# Patient Record
Sex: FEMALE
[De-identification: names, ages, dates, MRNs, and addresses within clinical notes are randomized per-mention and may not be internally consistent; named-entity substitution may affect disease eponyms.]

---

## 2018-04-19 ENCOUNTER — HOSPITAL ENCOUNTER (EMERGENCY)
Dept: HOSPITAL 31 - C.ER | Age: 23
Discharge: HOME | End: 2018-04-19
Payer: MEDICAID

## 2018-04-19 VITALS — OXYGEN SATURATION: 100 %

## 2018-04-19 VITALS
HEART RATE: 95 BPM | TEMPERATURE: 98.1 F | RESPIRATION RATE: 18 BRPM | DIASTOLIC BLOOD PRESSURE: 70 MMHG | SYSTOLIC BLOOD PRESSURE: 109 MMHG

## 2018-04-19 DIAGNOSIS — N83.209: Primary | ICD-10-CM

## 2018-04-19 LAB
ALBUMIN SERPL-MCNC: 4.6 G/DL (ref 3.5–5)
ALBUMIN/GLOB SERPL: 1.2 {RATIO} (ref 1–2.1)
ALT SERPL-CCNC: 28 U/L (ref 9–52)
AMYLASE SERPL-CCNC: 94 U/L (ref 30–110)
AST SERPL-CCNC: 39 U/L (ref 14–36)
BASOPHILS # BLD AUTO: 0 K/UL (ref 0–0.2)
BASOPHILS NFR BLD: 0.3 % (ref 0–2)
BILIRUB UR-MCNC: NEGATIVE MG/DL
BUN SERPL-MCNC: 15 MG/DL (ref 7–17)
CALCIUM SERPL-MCNC: 8.9 MG/DL (ref 8.6–10.4)
COLOR UR: YELLOW
EOSINOPHIL # BLD AUTO: 0 K/UL (ref 0–0.7)
EOSINOPHIL NFR BLD: 0.1 % (ref 0–4)
ERYTHROCYTE [DISTWIDTH] IN BLOOD BY AUTOMATED COUNT: 13.1 % (ref 11.5–14.5)
GFR NON-AFRICAN AMERICAN: > 60
GLUCOSE UR STRIP-MCNC: NEGATIVE MG/DL
HCG,QUALITATIVE URINE: NEGATIVE
HGB BLD-MCNC: 14.5 G/DL (ref 11–16)
LIPASE: 75 U/L (ref 23–300)
LYMPHOCYTES # BLD AUTO: 1 K/UL (ref 1–4.3)
LYMPHOCYTES NFR BLD AUTO: 10 % (ref 20–40)
MCH RBC QN AUTO: 31.4 PG (ref 27–31)
MCHC RBC AUTO-ENTMCNC: 33.5 G/DL (ref 33–37)
MCV RBC AUTO: 93.6 FL (ref 81–99)
MONOCYTES # BLD: 0.3 K/UL (ref 0–0.8)
MONOCYTES NFR BLD: 2.4 % (ref 0–10)
NEUTROPHILS # BLD: 9.1 K/UL (ref 1.8–7)
NEUTROPHILS NFR BLD AUTO: 87.2 % (ref 50–75)
NRBC BLD AUTO-RTO: 0 % (ref 0–2)
PH UR STRIP: 6 [PH] (ref 5–8)
PLATELET # BLD: 197 K/UL (ref 130–400)
PMV BLD AUTO: 9.6 FL (ref 7.2–11.7)
PROT UR STRIP-MCNC: NEGATIVE MG/DL
RBC # BLD AUTO: 4.62 MIL/UL (ref 3.8–5.2)
RBC # UR STRIP: NEGATIVE /UL
SP GR UR STRIP: 1.02 (ref 1–1.03)
UROBILINOGEN UR-MCNC: 0.2 MG/DL (ref 0.2–1)
WBC # BLD AUTO: 10.4 K/UL (ref 4.8–10.8)

## 2018-04-19 PROCEDURE — 96361 HYDRATE IV INFUSION ADD-ON: CPT

## 2018-04-19 PROCEDURE — 80053 COMPREHEN METABOLIC PANEL: CPT

## 2018-04-19 PROCEDURE — 76830 TRANSVAGINAL US NON-OB: CPT

## 2018-04-19 PROCEDURE — 85025 COMPLETE CBC W/AUTO DIFF WBC: CPT

## 2018-04-19 PROCEDURE — 82150 ASSAY OF AMYLASE: CPT

## 2018-04-19 PROCEDURE — 99283 EMERGENCY DEPT VISIT LOW MDM: CPT

## 2018-04-19 PROCEDURE — 81001 URINALYSIS AUTO W/SCOPE: CPT

## 2018-04-19 PROCEDURE — 83690 ASSAY OF LIPASE: CPT

## 2018-04-19 PROCEDURE — 76856 US EXAM PELVIC COMPLETE: CPT

## 2018-04-19 PROCEDURE — 96374 THER/PROPH/DIAG INJ IV PUSH: CPT

## 2018-04-19 PROCEDURE — 84703 CHORIONIC GONADOTROPIN ASSAY: CPT

## 2018-04-19 NOTE — US
HISTORY:

Sharp left hemipelvis pain. Menstrual status: LMP 02/27/2018.  



COMPARISON:

None available.



TECHNIQUE:

Transabdominal, transvaginal. Real -time technique with 2D, duplex 

and color Doppler.



FINDINGS:



UTERUS:

Measures 3.8 x 5.1 x 8.6 cm. Normal in size and appearance. No 

fibroid or other mass lesion seen.



ENDOMETRIUM:

Measures 12.6 mm in diameter. Endometrial thickening.  Tiny cystic 

focus 1.6 x 1.9 x 3.3 mm. 



CERVIX:

No cervical abnormality identified.Incidental finding: Nabothian 

cysts the largest measures less than 1 cm. 



RIGHT OVARY:

Measures 3.8 x 4.1 x 5.7 cm. No solid mass. Normal flow. Multiple 

subcentimeter follicles. 



LEFT OVARY:

Measures 3.4 x 4.2 x 5.9 cm. No solid mass. Normal flow. Complex 

perhaps hemorrhagic cyst 1.6 x 1.8 x 1.7 cm



FREE FLUID:

Trace free fluid identified in the pelvis/cul de sac.



OTHER FINDINGS:

None. 



IMPRESSION:

Endometrial hypertrophy, tiny cystic structure within the 

endometrium.  Correlation with beta HCG if clinically appropriate 

advised



Additional benign and/or incidental findings described above.

## 2018-04-19 NOTE — C.PDOC
History Of Present Illness


21 y/o female presents to the ER complaining of sudden onset of pelvic pain 

which started 1 hr PTA. Patient states that has been having irregular periods 

and her last period was 2 months ago. Patient denies having fever,chills, back 

pain, hematuria, and dysuria.


Chief Complaint (Nursing): Abdominal Pain


History Per: Patient


History/Exam Limitations: no limitations


Onset/Duration Of Symptoms: Hrs


Current Symptoms Are (Timing): Still Present


Severity: Moderate





Past Medical History


Reviewed: Historical Data, Nursing Documentation, Vital Signs


Vital Signs: 


 Last Vital Signs











Temp  98.1 F   04/19/18 15:17


 


Pulse  95 H  04/19/18 15:17


 


Resp  18   04/19/18 15:17


 


BP  109/70   04/19/18 15:17


 


Pulse Ox  100   04/19/18 18:41














- Medical History


PMH: No Chronic Diseases


Surgical History: No Surg Hx


Family History: States: No Known Family Hx





- Social History


Hx Alcohol Use: Yes


Hx Substance Use: No





- Immunization History


Hx Tetanus Toxoid Vaccination: No


Hx Influenza Vaccination: No


Hx Pneumococcal Vaccination: No





Review Of Systems


Except As Marked, All Systems Reviewed And Found Negative.


Constitutional: Negative for: Fever, Chills


Genitourinary: Positive for: Pelvic Pain.  Negative for: Dysuria, Hematuria


Musculoskeletal: Negative for: Back Pain





Physical Exam





- Physical Exam


Appears: Non-toxic, No Acute Distress


Skin: Normal Color, Warm, Dry


Head: Atraumatic, Normacephalic


Eye(s): bilateral: Normal Inspection


Nose: Normal


Oral Mucosa: Moist


Neck: Supple


Chest: Symmetrical


Cardiovascular: Rhythm Regular


Respiratory: Normal Breath Sounds, No Rales, No Rhonchi, No Wheezing


Gastrointestinal/Abdominal: Soft, Tenderness (suprapubic tenderness)


Extremity: Normal ROM


Neurological/Psych: Oriented x3, Normal Speech





ED Course And Treatment





- Laboratory Results


Result Diagrams: 


 04/19/18 12:43





 04/19/18 12:43


O2 Sat by Pulse Oximetry: 100 (RA)


Pulse Ox Interpretation: Normal





- CT Scan/US


  ** US-Pelvis


Other Rad Studies (CT/US): Read By Radiologist, Radiology Report Reviewed


CT/US Interpretation: HISTORY:  Sharp left hemipelvis pain. Menstrual status: 

LMP 02/27/2018.  COMPARISON:  None available.  TECHNIQUE:  Transabdominal, 

transvaginal. Real -time technique with 2D, duplex and color Doppler.  FINDINGS

:  UTERUS:  Measures 3.8 x 5.1 x 8.6 cm. Normal in size and appearance. No 

fibroid or other mass lesion seen.  ENDOMETRIUM:  Measures 12.6 mm in diameter. 

Endometrial thickening.  Tiny cystic focus 1.6 x 1.9 x 3.3 mm.  CERVIX:  No 

cervical abnormality identified.Incidental finding: Nabothian cysts the largest 

measures less than 1 cm.  RIGHT OVARY:  Measures 3.8 x 4.1 x 5.7 cm. No solid 

mass. Normal flow. Multiple subcentimeter follicles.  LEFT OVARY:  Measures 3.4 

x 4.2 x 5.9 cm. No solid mass. Normal flow. Complex perhaps hemorrhagic cyst 

1.6 x 1.8 x 1.7 cm.  FREE FLUID:  Trace free fluid identified in the pelvis/cul 

de sac.  OTHER FINDINGS:  None.  IMPRESSION:  Endometrial hypertrophy, tiny 

cystic structure within the endometrium.  Correlation with beta HCG if 

clinically appropriate advised.  Additional benign and/or incidental findings 

described above.


Progress Note: Labs, UA, and US- Pelvis ordered. Patient given IV Fluids and 

Toradol IV. On re-evaluation patient feels better and is stable to be d/c home 

with PMD and OBGYN follow up.





Disposition





- Disposition


Referrals: 


[facility] [Outside]


Disposition: HOME/ ROUTINE


Disposition Time: 15:05


Condition: STABLE


Additional Instructions: 


Follow up with OBGYN within 2-3 days. Return to ED if feel worse.


Prescriptions: 


Naproxen [Naprosyn] 1 tab PO BID PRN #25 tab


 PRN Reason: Pain


Instructions:  Ovarian Cysts


Forms:  [facility] (English)





- Clinical Impression


Clinical Impression: 


 Ovarian cyst








- PA / NP / Resident Statement


MD/DO has reviewed & agrees with the documentation as recorded.





- Scribe Statement


The provider has reviewed the documentation as recorded by the Lowell Montague





Provider Attestation





All medical record entries made by the Collinsibjuarez were at my direction and 

personally dictated by me. I have reviewed the chart and agree that the record 

accurately reflects my personal performance of the history, physical exam, 

medical decision making, and the department course for this patient. I have 

also personally directed, reviewed, and agree with the discharge instructions 

and disposition.

## 2018-07-13 ENCOUNTER — HOSPITAL ENCOUNTER (EMERGENCY)
Dept: HOSPITAL 31 - C.ER | Age: 23
Discharge: HOME | End: 2018-07-13
Payer: MEDICAID

## 2018-07-13 VITALS
OXYGEN SATURATION: 100 % | RESPIRATION RATE: 18 BRPM | HEART RATE: 87 BPM | DIASTOLIC BLOOD PRESSURE: 90 MMHG | SYSTOLIC BLOOD PRESSURE: 138 MMHG | TEMPERATURE: 98.8 F

## 2018-07-13 DIAGNOSIS — H44.001: Primary | ICD-10-CM

## 2018-07-13 NOTE — C.PDOC
History Of Present Illness


22 year old female presents to the ER complaining of burning and foreign body 

sensation to the right eye that began last night. Denies any trauma or eye 

injury. Patient states she was lying down and felt as if an eyelash was in the 

eye. She then took her contacts out and could not find any eyelash or foreign 

body. This morning the sensation persisted prompting patient to come in for 

evaluation. Otherwise denies any blurred or double vision, crusting, discharge, 

fever, or itchiness.





Time Seen by Provider: 18 14:15


Chief Complaint (Nursing): Eye Problem


History Per: Patient


History/Exam Limitations: no limitations


Onset/Duration Of Symptoms: Days


Current Symptoms Are (Timing): Still Present


Injury To Eye?: No


Wears Contact Lens?: Yes


Associated Symptoms: FB Sensation





Past Medical History


Reviewed: Historical Data, Nursing Documentation, Vital Signs


Vital Signs: 


 Last Vital Signs











Temp  98.8 F   18 14:11


 


Pulse  87   18 14:11


 


Resp  18   18 14:11


 


BP  138/90   18 14:11


 


Pulse Ox  100   18 14:37











Family History: States: No Known Family Hx





- Social History


Hx Tobacco Use: No


Hx Alcohol Use: Yes


Hx Substance Use: No





- Immunization History


Hx Tetanus Toxoid Vaccination: No


Hx Influenza Vaccination: No


Hx Pneumococcal Vaccination: No





Review Of Systems


Except As Marked, All Systems Reviewed And Found Negative.


Constitutional: Negative for: Fever


Eyes: Positive for: Other (burning and foreign body sensation to right eye).  

Negative for: Vision Change, Eyelid Inflammation





Physical Exam





- Physical Exam


Appears: Well, Non-toxic, No Acute Distress


Skin: Normal Color, Warm, Dry


Head: Atraumatic, Normacephalic


Eye(s): bilateral: PERRL, EOMI, right: Other (1 small white spot over the cornea

, circular in nature, with some uptake of fluorescein contrast)


Nose: Normal


Oral Mucosa: Moist


Neck: Normal ROM, Supple


Neurological/Psych: Oriented x3, Normal Speech


Gait: Steady





ED Course And Treatment


O2 Sat by Pulse Oximetry: 100 (RA)


Pulse Ox Interpretation: Normal





Medical Decision Making


Medical Decision Making: 


Impression: 23 y/o female with FB sensation to right eye





Time: 14:35


Plan:


Eye examined using contrast, revealin small white spot over the cornea, 

circular in nature, with some uptake of fluorescein contrast.














Disposition


Counseled Patient/Family Regarding: Diagnosis, Need For Followup, Rx Given





- Disposition


Referrals: 


[facility] [Staff Provider] - 


Disposition: HOME/ ROUTINE


Disposition Time: 14:38


Condition: STABLE


Prescriptions: 


Bacitracin [Bacitracin Opht OINT] 1 applic OD TID #1 tube


Forms:  [facility] (English), General Discharge Instructions





- POA


Present On Arrival: None





- Clinical Impression


Clinical Impression: 


 Eye infection








- Scribe Statement


The provider has reviewed the documentation as recorded by the Lowell Mejia





Provider Attestation: 





All medical record entries made by the Collinsibjuarez were at my direction and 

personally dictated by me. I have reviewed the chart and agree that the record 

accurately reflects my personal performance of the history, physical exam, 

medical decision making, and the department course for this patient. I have 

also personally directed, reviewed, and agree with the discharge instructions 

and disposition.